# Patient Record
Sex: MALE | Race: BLACK OR AFRICAN AMERICAN | Employment: OTHER | URBAN - METROPOLITAN AREA
[De-identification: names, ages, dates, MRNs, and addresses within clinical notes are randomized per-mention and may not be internally consistent; named-entity substitution may affect disease eponyms.]

---

## 2019-10-30 ENCOUNTER — TELEPHONE (OUTPATIENT)
Dept: UROLOGY | Facility: AMBULATORY SURGERY CENTER | Age: 59
End: 2019-10-30

## 2019-10-30 NOTE — TELEPHONE ENCOUNTER
Patient has never had previous urologist and all his records and psa labs are in [place]    Being referred to Urology for routine exam

## 2019-11-04 ENCOUNTER — TELEPHONE (OUTPATIENT)
Dept: UROLOGY | Facility: MEDICAL CENTER | Age: 59
End: 2019-11-04

## 2019-11-04 NOTE — TELEPHONE ENCOUNTER
[employer] [facility] [employer]  Member ID# [id number]  Group# [id number]  Subscriber: David Pickering (wife)  Address: [address] [place] [address], [address] [place] [address] [place]

## 2019-11-05 ENCOUNTER — OFFICE VISIT (OUTPATIENT)
Dept: UROLOGY | Facility: CLINIC | Age: 59
End: 2019-11-05
Payer: COMMERCIAL

## 2019-11-05 VITALS
BODY MASS INDEX: 31.81 KG/M2 | DIASTOLIC BLOOD PRESSURE: 86 MMHG | SYSTOLIC BLOOD PRESSURE: 124 MMHG | HEART RATE: 65 BPM | HEIGHT: 73 IN | WEIGHT: 240 LBS

## 2019-11-05 DIAGNOSIS — R31.29 MICROSCOPIC HEMATURIA: Primary | ICD-10-CM

## 2019-11-05 PROCEDURE — 99204 OFFICE O/P NEW MOD 45 MIN: CPT | Performed by: UROLOGY

## 2019-11-05 RX ORDER — ATORVASTATIN CALCIUM 80 MG/1
80 TABLET, FILM COATED ORAL DAILY
COMMUNITY
Start: 2019-08-23 | End: 2020-08-22

## 2019-11-05 RX ORDER — ASPIRIN 81 MG/1
TABLET, COATED ORAL
COMMUNITY
Start: 2019-09-17

## 2019-11-05 RX ORDER — INSULIN GLARGINE 100 [IU]/ML
INJECTION, SOLUTION SUBCUTANEOUS
COMMUNITY
Start: 2019-11-01

## 2019-11-05 RX ORDER — LOSARTAN POTASSIUM 100 MG/1
100 TABLET ORAL DAILY
COMMUNITY
Start: 2019-08-23

## 2019-11-05 RX ORDER — DOCUSATE SODIUM 100 MG
CAPSULE ORAL
COMMUNITY
Start: 2019-09-04

## 2019-11-05 RX ORDER — FLASH GLUCOSE SENSOR
KIT MISCELLANEOUS
COMMUNITY
Start: 2019-10-13

## 2019-11-05 RX ORDER — INSULIN LISPRO 100 [IU]/ML
INJECTION, SOLUTION INTRAVENOUS; SUBCUTANEOUS
COMMUNITY
Start: 2019-09-24

## 2019-11-05 RX ORDER — FLASH GLUCOSE SCANNING READER
EACH MISCELLANEOUS
COMMUNITY
Start: 2019-10-13

## 2019-11-05 RX ORDER — AMLODIPINE BESYLATE 5 MG/1
10 TABLET ORAL
COMMUNITY
Start: 2019-08-29

## 2019-11-05 RX ORDER — MELATONIN
1000 2 TIMES DAILY
Refills: 0 | COMMUNITY
Start: 2019-08-05

## 2019-11-05 RX ORDER — TRIAMCINOLONE ACETONIDE 1 MG/G
CREAM TOPICAL
COMMUNITY
Start: 2019-09-05

## 2019-11-05 RX ORDER — LABETALOL 200 MG/1
200 TABLET, FILM COATED ORAL 2 TIMES DAILY
COMMUNITY
Start: 2019-08-23 | End: 2020-08-22

## 2019-11-05 RX ORDER — POLYETHYLENE GLYCOL-3350, SODIUM CHLORIDE, POTASSIUM CHLORIDE AND SODIUM BICARBONATE 420; 11.2; 5.72; 1.48 G/438.4G; G/438.4G; G/438.4G; G/438.4G
POWDER, FOR SOLUTION ORAL
Refills: 0 | COMMUNITY
Start: 2019-08-30

## 2019-11-05 RX ORDER — BLOOD-GLUCOSE METER
EACH MISCELLANEOUS
Refills: 0 | COMMUNITY
Start: 2019-08-05

## 2019-11-05 RX ORDER — PEN NEEDLE, DIABETIC 31 GX5/16"
NEEDLE, DISPOSABLE MISCELLANEOUS
COMMUNITY
Start: 2019-10-19

## 2019-11-05 RX ORDER — CLOPIDOGREL BISULFATE 75 MG/1
75 TABLET ORAL DAILY
Refills: 0 | COMMUNITY
Start: 2019-08-05

## 2019-11-05 NOTE — PROGRESS NOTES
Referring Physician: No primary care provider on file  A copy of this note was sent to the referring physician  Diagnoses and all orders for this visit:    Microscopic hematuria  -     Cystoscopy; Future  -     Creatinine, serum; Future  -     CT abdomen pelvis w wo contrast; Future    Other orders  -     amLODIPine (NORVASC) 5 mg tablet; 10 mg   -     ASPIRIN LOW DOSE 81 MG EC tablet  -     atorvastatin (LIPITOR) 80 mg tablet; Take 80 mg by mouth daily  -     Blood Glucose Monitoring Suppl (ONE TOUCH ULTRA 2) w/Device KIT; TEST 3 TIMES A DAY  -     cholecalciferol (VITAMIN D3) 1,000 units tablet; Take 1,000 Units by mouth 2 (two) times a day  -     clopidogrel (PLAVIX) 75 mg tablet; Take 75 mg by mouth daily  -     Continuous Blood Gluc  (FREESTYLE RENITA 14 DAY READER) MAU  -     Continuous Blood Gluc Sensor (FREESTYLE RENITA 14 DAY SENSOR) MISC  -     STOOL SOFTENER 100 MG capsule  -     ONE TOUCH ULTRA TEST test strip  -     insulin glargine (LANTUS SOLOSTAR) 100 units/mL injection pen; Inject 60 Units under the skin daily  -     LANTUS SOLOSTAR 100 units/mL injection pen  -     insulin lispro (HUMALOG KWIKPEN) 100 units/mL injection pen; 5 units for every 50mg/dL above 150mg/dL prn  Max dose of 50 units/day  -     B-D ULTRAFINE III SHORT PEN 31G X 8 MM MISC  -     labetalol (NORMODYNE) 200 mg tablet; Take 200 mg by mouth 2 (two) times a day  -     ONETOUCH DELICA LANCETS FINE MISC; TEST 3 TIMES A DAY  -     losartan (COZAAR) 100 MG tablet; Take 100 mg by mouth daily  -     GAVILYTE-N WITH FLAVOR PACK 420 g solution; Take as directed  -     triamcinolone (KENALOG) 0 1 % cream            Assessment and plan:       1  Microscopic hematuria      2  Weak urinary stream      Today, we discussed the multifactorial etiology of hematuria    I reviewed images to familiarize the patient with basic anatomy of the urinary tract, and we discussed how hematuria can be a herald sign of multiple genitourinary pathologies  As such, I recommended preceding with a full hematuria workup, including a Urogram protocol CT, a cystoscopy, as well as a urinalysis, urine culture and urine cytology  The patient understands the logic of this approach  The patient will be scheduled for these in the near future  All questions were answered today and there were no perceived barriers to education  Sarath Gallegos MD      Chief Complaint      microscopic hematuria      History of Present Illness     Yesica Santiago is a 64 y o  Male referred in consultation for microscopic hematuria  He was recently found to have 3-5 red blood cells per high-powered field on screening urinalysis  He has developed some weakening of his lower urinary tract stream over the past few months  He does have a history of prior stroke in feels his urination habits have changed since that time  He denies any frequency urgency or daytime or nocturnal incontinence  He denies any history of gross hematuria  Familial history is negative for genitourinary malignancies  He is a lifelong nonsmoker  He does take Plavix    Detailed Urologic History     - please refer to HPI    Review of Systems     Review of Systems   Constitutional: Negative for activity change and fatigue  HENT: Negative for congestion  Eyes: Negative for visual disturbance  Respiratory: Negative for shortness of breath and wheezing  Cardiovascular: Negative for chest pain and leg swelling  Gastrointestinal: Negative for abdominal pain  Endocrine: Negative for polyuria  Genitourinary: Positive for difficulty urinating  Negative for dysuria, flank pain, hematuria and urgency  Musculoskeletal: Negative for back pain  Allergic/Immunologic: Negative for immunocompromised state  Neurological: Negative for dizziness and numbness  Psychiatric/Behavioral: Negative for dysphoric mood  All other systems reviewed and are negative        AUA SYMPTOM SCORE Most Recent Value   AUA SYMPTOM SCORE   How often have you had a sensation of not emptying your bladder completely after you finished urinating? 0   How often have you had to urinate again less than two hours after you finished urinating? 0   How often have you found you stopped and started again several times when you urinate? 4   How often have you found it difficult to postpone urination? 0   How often have you had a weak urinary stream?  5   How often have you had to push or strain to begin urination? 0   How many times did you most typically get up to urinate from the time you went to bed at night until the time you got up in the morning? 4   Quality of Life: If you were to spend the rest of your life with your urinary condition just the way it is now, how would you feel about that?  5   AUA SYMPTOM SCORE  13            Allergies     No Known Allergies    Physical Exam     Physical Exam   Constitutional: He is oriented to person, place, and time  He appears well-developed and well-nourished  No distress  HENT:   Head: Normocephalic and atraumatic  Eyes: EOM are normal    Neck: Normal range of motion  Cardiovascular:    Negative lower extremity edema   Pulmonary/Chest: Effort normal and breath sounds normal    Abdominal: Soft  Genitourinary:   Genitourinary Comments:  20 g prostate no nodules or induration   Musculoskeletal: Normal range of motion  Neurological: He is alert and oriented to person, place, and time  Skin: Skin is warm  Psychiatric: He has a normal mood and affect   His behavior is normal            Vital Signs  Vitals:    11/05/19 0940   BP: 124/86   BP Location: Left arm   Patient Position: Sitting   Cuff Size: Extra-Large   Pulse: 65   Weight: 109 kg (240 lb)   Height: 6' 1" (1 854 m)         Current Medications       Current Outpatient Medications:     amLODIPine (NORVASC) 5 mg tablet, 10 mg , Disp: , Rfl:     ASPIRIN LOW DOSE 81 MG EC tablet, , Disp: , Rfl:    atorvastatin (LIPITOR) 80 mg tablet, Take 80 mg by mouth daily, Disp: , Rfl:     B-D ULTRAFINE III SHORT PEN 31G X 8 MM MISC, , Disp: , Rfl:     Blood Glucose Monitoring Suppl (ONE TOUCH ULTRA 2) w/Device KIT, TEST 3 TIMES A DAY, Disp: , Rfl: 0    Continuous Blood Gluc  (FREESTYLE RENITA 14 DAY READER) MAU, , Disp: , Rfl:     Continuous Blood Gluc Sensor (FREESTYLE RENITA 14 DAY SENSOR) MISC, , Disp: , Rfl:     insulin glargine (LANTUS SOLOSTAR) 100 units/mL injection pen, Inject 60 Units under the skin daily, Disp: , Rfl:     insulin lispro (HUMALOG KWIKPEN) 100 units/mL injection pen, 5 units for every 50mg/dL above 150mg/dL prn  Max dose of 50 units/day, Disp: , Rfl:     labetalol (NORMODYNE) 200 mg tablet, Take 200 mg by mouth 2 (two) times a day, Disp: , Rfl:     LANTUS SOLOSTAR 100 units/mL injection pen, , Disp: , Rfl:     losartan (COZAAR) 100 MG tablet, Take 100 mg by mouth daily, Disp: , Rfl:     ONE TOUCH ULTRA TEST test strip, , Disp: , Rfl:     ONETOUCH DELICA LANCETS FINE MISC, TEST 3 TIMES A DAY, Disp: , Rfl: 0    STOOL SOFTENER 100 MG capsule, , Disp: , Rfl:     cholecalciferol (VITAMIN D3) 1,000 units tablet, Take 1,000 Units by mouth 2 (two) times a day, Disp: , Rfl: 0    clopidogrel (PLAVIX) 75 mg tablet, Take 75 mg by mouth daily, Disp: , Rfl: 0    GAVILYTE-N WITH FLAVOR PACK 420 g solution, Take as directed, Disp: , Rfl: 0    triamcinolone (KENALOG) 0 1 % cream, , Disp: , Rfl:       Active Problems     There is no problem list on file for this patient  Past Medical History     Past Medical History:   Diagnosis Date    Diabetes mellitus [facility] )     Hypertension     Sleep apnea     Stroke ([facility] ) 07/18/2019         Surgical History     Past Surgical History:   Procedure Laterality Date    COLONOSCOPY           Family History     History reviewed  No pertinent family history        Social History     Social History     Social History     Tobacco Use   Smoking Status Never Smoker   Smokeless Tobacco Never Used         Pertinent Lab Values     No results found for: CREATININE    No results found for: PSA    @RESULTRCNT(1H])@      Pertinent Imaging      - n/a    Portions of the record may have been created with voice recognition software   Occasional wrong word or "sound a like" substitutions may have occurred due to the inherent limitations of voice recognition software   Read the chart carefully and recognize, using context, where substitutions have occurred

## 2019-11-21 ENCOUNTER — HOSPITAL ENCOUNTER (OUTPATIENT)
Dept: CT IMAGING | Facility: CLINIC | Age: 59
Discharge: HOME | End: 2019-11-21
Payer: COMMERCIAL

## 2019-11-21 DIAGNOSIS — R31.29 MICROSCOPIC HEMATURIA: ICD-10-CM

## 2019-11-21 PROCEDURE — 74178 CT ABD&PLV WO CNTR FLWD CNTR: CPT

## 2019-11-21 RX ADMIN — IOHEXOL 100 ML: 350 INJECTION, SOLUTION INTRAVENOUS at 11:32

## 2019-12-26 ENCOUNTER — PROCEDURE VISIT (OUTPATIENT)
Dept: UROLOGY | Facility: CLINIC | Age: 59
End: 2019-12-26
Payer: COMMERCIAL

## 2019-12-26 VITALS — HEIGHT: 73 IN | BODY MASS INDEX: 32.6 KG/M2 | WEIGHT: 246 LBS | HEART RATE: 71 BPM

## 2019-12-26 DIAGNOSIS — K63.9 THICKENED SMALL BOWEL: ICD-10-CM

## 2019-12-26 DIAGNOSIS — R31.29 MICROSCOPIC HEMATURIA: Primary | ICD-10-CM

## 2019-12-26 LAB
SL AMB  POCT GLUCOSE, UA: NORMAL
SL AMB LEUKOCYTE ESTERASE,UA: NORMAL
SL AMB POCT BILIRUBIN,UA: NORMAL
SL AMB POCT BLOOD,UA: NORMAL
SL AMB POCT CLARITY,UA: CLEAR
SL AMB POCT COLOR,UA: YELLOW
SL AMB POCT KETONES,UA: NORMAL
SL AMB POCT NITRITE,UA: NORMAL
SL AMB POCT PH,UA: 5
SL AMB POCT SPECIFIC GRAVITY,UA: 1.02
SL AMB POCT URINE PROTEIN: NORMAL
SL AMB POCT UROBILINOGEN: NORMAL

## 2019-12-26 PROCEDURE — 81002 URINALYSIS NONAUTO W/O SCOPE: CPT | Performed by: UROLOGY

## 2019-12-26 PROCEDURE — 52000 CYSTOURETHROSCOPY: CPT | Performed by: UROLOGY

## 2019-12-26 NOTE — PROGRESS NOTES
Office Cystoscopy Procedure Note    Indication:    Hematuria     upper tract imaging results:  FINDINGS:     ABDOMEN     RIGHT KIDNEY AND URETER:  No solid renal mass  No detectable urothelial mass though the distal ureter is not opacified on excretory images  No hydronephrosis or hydroureter  No urinary tract calculi  No perinephric collection      LEFT KIDNEY AND URETER:  No solid renal mass  No detectable urothelial mass though the proximal ureter is not opacified on excretory images  No hydronephrosis or hydroureter  No urinary tract calculi  No perinephric collection      URINARY BLADDER:  No bladder wall mass  No calculi         LOWER CHEST:  No clinically significant abnormality identified in the visualized lower chest      LIVER/BILIARY TREE:  Unremarkable  There is variant anatomy of the portal vein with the left portal vein arising from the anterior right portal vein  There is mild dilatation of the portal vein at the confluence of the left and anterior right veins      GALLBLADDER:  No calcified gallstones  No pericholecystic inflammatory change      SPLEEN:  Unremarkable      PANCREAS:  Unremarkable      ADRENAL GLANDS:  Unremarkable      STOMACH AND BOWEL:  Unremarkable      ABDOMINOPELVIC CAVITY:  No ascites  No free intraperitoneal air  There is mild stranding in the small bowel mesentery with subcentimeter lymph nodes  There is no pathologic lymphadenopathy      VESSELS:  Unremarkable for patient's age      PELVIS     REPRODUCTIVE ORGANS:  Unremarkable for patient's age      APPENDIX: No findings to suggest appendicitis      ABDOMINAL WALL/INGUINAL REGIONS:  Unremarkable      OSSEOUS STRUCTURES:  No acute fracture or destructive osseous lesion      IMPRESSION:     1  No CT abnormality identified to account for hematuria   Note that portions of the ureters were not opacified on excretory images      2   Small right renal cyst      3   Mild stranding in the small bowel mesentery with subcentimeter lymph nodes, 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  No pathologically enlarged lymph nodes are identified though there are no prior studies available for   comparison and a follow-up standard protocol CT of the abdomen is recommended in 6 months as this appearance can also be seen with early lymphoproliferative disease      The study was marked in EPIC for significant notification  Informed consent   The risks, benefits, complications, treatment options, and expected outcomes were discussed with the patient  The patient concurred with the proposed plan and provided informed consent  Anesthesia  Lidocaine jelly 2%    Antibiotic prophylaxis   None    Procedure  The patient was placed in the supineposition, was prepped and draped in the usual manner using sterile technique, and 2% lidocaine jelly instilled into the urethra  A 17 F flexible cystoscope was then inserted into the urethra and the urethra and bladder carefully examined  The following findings were noted:    Findings:  Urethra:  Normal  Prostate:  Normal  Bladder:  Normal  Ureteral orifices:  Normal  Other findings:  None     Specimens: None                 Complications:    None; patient tolerated the procedure well           Disposition: To home after 30 minute observation  Condition: Stable    Plan:     Patient has now completed a full hematuria evaluation  Thankfully there are no signs of genitourinary   Pathology or certainly any concern for malignancy  I recommended annual urinalysis as a continue portion of his well visits with his primary care physician  If he were to have persistent microscopic hematuria x3 years I would like to see him back for repeat evaluation  I did discuss the small bowel thickening and subclinical upper abdominal lymphadenopathy on his CT scan  I discussed this with this patient and his wife    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

## 2020-01-23 ENCOUNTER — OFFICE VISIT (OUTPATIENT)
Dept: GASTROENTEROLOGY | Facility: CLINIC | Age: 60
End: 2020-01-23
Payer: COMMERCIAL

## 2020-01-23 VITALS
SYSTOLIC BLOOD PRESSURE: 130 MMHG | WEIGHT: 246 LBS | DIASTOLIC BLOOD PRESSURE: 82 MMHG | HEIGHT: 73 IN | BODY MASS INDEX: 32.6 KG/M2 | HEART RATE: 59 BPM

## 2020-01-23 DIAGNOSIS — K63.9 THICKENED SMALL BOWEL: ICD-10-CM

## 2020-01-23 PROCEDURE — 99214 OFFICE O/P EST MOD 30 MIN: CPT | Performed by: INTERNAL MEDICINE

## 2020-01-23 NOTE — PROGRESS NOTES
Consultation - [facility]  Lissette Ankush 1960 64 y o  male     ASSESSMENT @ PLAN:   He is a 63-year-old male who had a CT scan with mild stranding in the small bowel with no overt GI symptoms  I suspect this is of no consequence  He has a remote history of peptic ulcer disease in 2001 with H pylori treated  He had a normal colonoscopy with 6 polyps removed in December of 2019     1 will order CT scan in 6 months is recommended    2 will do stool H pylori antigen    Chief Complaint:     HPI:   He is a 63-year-old man who had a CT scan with Urology that showed possible mesenteric panniculitis or haziness in the small bowel mesentery  The patient has no abdominal pain  He has minor constipation that is improved on Colace  He has no melena hematochezia  He had a colonoscopy in December 26 that showed 6 polyps removed otherwise normal   He has remote history of peptic ulcer disease and had a bleeding ulcer treated in [place] in 2001 that was from H pylori  He was treated with Biaxin Prevpac and was completely treated  He never had a test of cure  He is doing well  He has no heartburn regurgitation nausea vomiting the patient has no night sweats or weight loss  REVIEW OF SYSTEMS:     CONSTITUTIONAL: Denies any fever, chills, or rigors  Good appetite, and no recent weight loss  HEENT: No earache or tinnitus  Denies hearing loss or visual disturbances  CARDIOVASCULAR: No chest pain or palpitations  RESPIRATORY: Denies any cough, hemoptysis, shortness of breath or dyspnea on exertion  GASTROINTESTINAL: As noted in the History of Present Illness  GENITOURINARY: No problems with urination  Denies any hematuria or dysuria  NEUROLOGIC: No dizziness or vertigo, denies headaches  MUSCULOSKELETAL: Denies any muscle or joint pain  SKIN: Denies skin rashes or itching  ENDOCRINE: Denies excessive thirst  Denies intolerance to heat or cold    PSYCHOSOCIAL: Denies depression or anxiety  Denies any recent memory loss  Past Medical History:   Diagnosis Date    [facility] mellitus ([facility] )     Hypertension     Sleep apnea     Stroke ([facility] ) 07/18/2019      Past Surgical History:   Procedure Laterality Date    COLONOSCOPY       Social History     Socioeconomic History    Marital status: /Civil Union     Spouse name: Not on file    Number of children: Not on file    Years of education: Not on file    Highest education level: Not on file   Occupational History    Not on file   Social Needs    Financial resource strain: Not on file    Food insecurity:     Worry: Not on file     Inability: Not on file    Transportation needs:     Medical: Not on file     Non-medical: Not on file   Tobacco Use    Smoking status: Never Smoker    Smokeless tobacco: Never Used   Substance and Sexual Activity    Alcohol use: Never     Frequency: Never    Drug use: Never    Sexual activity: Not on file   Lifestyle    Physical activity:     Days per week: Not on file     Minutes per session: Not on file    Stress: Not on file   Relationships    Social connections:     Talks on phone: Not on file     Gets together: Not on file     Attends Zoroastrianism service: Not on file     Active member of club or organization: Not on file     Attends meetings of clubs or organizations: Not on file     Relationship status: Not on file    Intimate partner violence:     Fear of current or ex partner: Not on file     Emotionally abused: Not on file     Physically abused: Not on file     Forced sexual activity: Not on file   Other Topics Concern    Not on file   Social History Narrative    Not on file     History reviewed  No pertinent family history  Patient has no known allergies    Current Outpatient Medications   Medication Sig Dispense Refill    amLODIPine (NORVASC) 5 mg tablet 10 mg       ASPIRIN LOW DOSE 81 MG EC tablet       atorvastatin (LIPITOR) 80 mg tablet Take 80 mg by mouth daily      B-D ULTRAFINE III SHORT PEN 31G X 8 MM MISC       Blood Glucose Monitoring Suppl (ONE TOUCH ULTRA 2) w/Device KIT TEST 3 TIMES A DAY  0    cholecalciferol (VITAMIN D3) 1,000 units tablet Take 1,000 Units by mouth 2 (two) times a day  0    Continuous Blood Gluc Sensor (FREESTYLE RENITA 14 DAY SENSOR) MISC       insulin lispro (HUMALOG KWIKPEN) 100 units/mL injection pen 5 units for every 50mg/dL above 150mg/dL prn  Max dose of 50 units/day      labetalol (NORMODYNE) 200 mg tablet Take 200 mg by mouth 2 (two) times a day      LANTUS SOLOSTAR 100 units/mL injection pen       losartan (COZAAR) 100 MG tablet Take 100 mg by mouth daily      ONE TOUCH ULTRA TEST test strip       ONETOUCH DELICA LANCETS FINE MISC TEST 3 TIMES A DAY  0    STOOL SOFTENER 100 MG capsule       triamcinolone (KENALOG) 0 1 % cream       clopidogrel (PLAVIX) 75 mg tablet Take 75 mg by mouth daily  0    Continuous Blood Gluc  (FREESTYLE RENITA 14 DAY READER) MAU       GAVILYTE-N WITH FLAVOR PACK 420 g solution Take as directed  0    insulin glargine (LANTUS SOLOSTAR) 100 units/mL injection pen Inject 60 Units under the skin daily       No current facility-administered medications for this visit  Blood pressure 130/82, pulse 59, height 6' 1" (1 854 m), weight 112 kg (246 lb)  PHYSICAL EXAM:     General Appearance:   Alert, cooperative, no distress, appears stated age    HEENT:   Normocephalic, atraumatic, anicteric      Neck:  Supple, symmetrical, trachea midline, no adenopathy;    thyroid: no enlargement/tenderness/nodules; no carotid  bruit or JVD    Lungs:   Clear to auscultation bilaterally; no rales, rhonchi or wheezing; respirations unlabored    Heart[de-identified]   S1 and S2 normal; regular rate and rhythm; no murmur, rub, or gallop     Abdomen:   Soft, non-tender, non-distended; normal bowel sounds; no masses, no organomegaly    Genitalia:   Deferred    Rectal:   Deferred    Extremities:  No cyanosis, clubbing or edema  Pulses:  2+ and symmetric all extremities    Skin:  Skin color, texture, turgor normal, no rashes or lesions    Lymph nodes:  No palpable cervical, axillary or inguinal lymphadenopathy        No results found for: WBC, HGB, HCT, MCV, PLT  No results found for: GLUCOSE, CALCIUM, NA, K, CO2, CL, BUN, CREATININE  No results found for: ALT, AST, GGT, ALKPHOS, BILITOT  No results found for: INR, PROTIME

## 2020-01-28 ENCOUNTER — APPOINTMENT (OUTPATIENT)
Dept: LAB | Facility: HOSPITAL | Age: 60
End: 2020-01-28
Attending: INTERNAL MEDICINE
Payer: COMMERCIAL

## 2020-01-28 DIAGNOSIS — K63.9 THICKENED SMALL BOWEL: ICD-10-CM

## 2020-01-28 PROCEDURE — 87338 HPYLORI STOOL AG IA: CPT

## 2020-01-29 LAB — H PYLORI AG STL QL IA: NEGATIVE

## 2020-05-29 ENCOUNTER — TRANSCRIBE ORDERS (OUTPATIENT)
Dept: ADMINISTRATIVE | Facility: HOSPITAL | Age: 60
End: 2020-05-29

## 2020-06-01 ENCOUNTER — HOSPITAL ENCOUNTER (OUTPATIENT)
Dept: CT IMAGING | Facility: CLINIC | Age: 60
Discharge: HOME | End: 2020-06-01
Payer: COMMERCIAL

## 2020-06-01 DIAGNOSIS — K63.9 THICKENED SMALL BOWEL: ICD-10-CM

## 2020-06-01 PROCEDURE — 74177 CT ABD & PELVIS W/CONTRAST: CPT

## 2020-06-01 RX ADMIN — IOHEXOL 100 ML: 350 INJECTION, SOLUTION INTRAVENOUS at 10:48

## 2020-06-02 ENCOUNTER — TELEPHONE (OUTPATIENT)
Dept: GASTROENTEROLOGY | Facility: CLINIC | Age: 60
End: 2020-06-02

## 2022-01-11 ENCOUNTER — TELEPHONE (OUTPATIENT)
Dept: GASTROENTEROLOGY | Facility: CLINIC | Age: 62
End: 2022-01-11

## 2022-01-11 NOTE — TELEPHONE ENCOUNTER
Recall call went out via [employer] in 2020 with no return calls from pt to schedule  Pt is due for a colon with Dr Jose De Jesus Santana for hx of tubular adenoma polyp/hx of large polyp  Recall letter mailed to pt

## 2022-12-05 RX ORDER — CHLORTHALIDONE 25 MG/1
1 TABLET ORAL DAILY
COMMUNITY
Start: 2022-09-28

## 2022-12-05 RX ORDER — DOCUSATE SODIUM 100 MG/1
100 CAPSULE, LIQUID FILLED ORAL EVERY 12 HOURS
COMMUNITY
Start: 2019-09-04

## 2022-12-05 RX ORDER — TRIAMCINOLONE ACETONIDE 1 MG/G
CREAM TOPICAL
COMMUNITY

## 2022-12-08 ENCOUNTER — OFFICE VISIT (OUTPATIENT)
Dept: GASTROENTEROLOGY | Facility: CLINIC | Age: 62
End: 2022-12-08

## 2022-12-08 VITALS
HEIGHT: 72 IN | SYSTOLIC BLOOD PRESSURE: 120 MMHG | WEIGHT: 257.2 LBS | BODY MASS INDEX: 34.84 KG/M2 | OXYGEN SATURATION: 98 % | DIASTOLIC BLOOD PRESSURE: 88 MMHG | HEART RATE: 70 BPM

## 2022-12-08 DIAGNOSIS — Z12.11 SCREENING FOR MALIGNANT NEOPLASM OF COLON: ICD-10-CM

## 2022-12-08 DIAGNOSIS — D12.6 ADENOMATOUS POLYP OF COLON, UNSPECIFIED PART OF COLON: Primary | ICD-10-CM

## 2022-12-08 DIAGNOSIS — R10.9 ABDOMINAL CRAMPING: ICD-10-CM

## 2022-12-08 RX ORDER — MONTELUKAST SODIUM 10 MG/1
TABLET ORAL
COMMUNITY
Start: 2022-10-11

## 2022-12-08 RX ORDER — DICYCLOMINE HCL 20 MG
20 TABLET ORAL EVERY 6 HOURS PRN
Qty: 75 TABLET | Refills: 2 | Status: SHIPPED | OUTPATIENT
Start: 2022-12-08

## 2022-12-08 RX ORDER — AMLODIPINE BESYLATE 10 MG/1
TABLET ORAL
COMMUNITY
Start: 2022-11-09

## 2022-12-08 RX ORDER — UBIQUINOL 100 MG
CAPSULE ORAL
COMMUNITY
Start: 2022-09-27

## 2022-12-08 NOTE — PATIENT INSTRUCTIONS
Scheduled date of colonoscopy (as of today):2/20/23  Physician performing colonoscopy: Kulwinder  Location of colonoscopy:[place]  Bowel prep reviewed with patient:Nidia/Miralax  Instructions reviewed with patient by:Shiva lam  Clearances:  none

## 2022-12-08 NOTE — PROGRESS NOTES
[facility]  Katina Anaya 63 y o  male MRN: [id number]       CC: Colonoscopy recall    HPI: Karina Wakefield is a 44-year-old male with history of type 2 diabetes, obstructive sleep apnea, hypertension and stroke in 2019  Patient presents to the office to schedule his recall colonoscopy  His last colonoscopy was in 2019 at [facility]  His biopsy results from care everywhere revealed that he had several tubular adenomas removed  Patient also notes that he experiences episodic episodes of alternating diarrhea and constipation with a lower abdominal cramping during times of increased anxiety  He denies signs or GI bleeding or unintentional weight loss  Review of Systems:    CONSTITUTIONAL: Denies any fever, chills, or rigors  Good appetite, and no recent weight loss  HEENT: No earache or tinnitus  Denies hearing loss or visual disturbances  CARDIOVASCULAR: No chest pain or palpitations  RESPIRATORY: Denies any cough, hemoptysis, shortness of breath or dyspnea on exertion  GASTROINTESTINAL: As noted in the History of Present Illness  GENITOURINARY: No problems with urination  Denies any hematuria or dysuria  NEUROLOGIC: No dizziness or vertigo, denies headaches  MUSCULOSKELETAL: Denies any muscle or joint pain  SKIN: Denies skin rashes or itching  ENDOCRINE: Denies excessive thirst  Denies intolerance to heat or cold  PSYCHOSOCIAL: Denies depression or anxiety  Denies any recent memory loss         Current Outpatient Medications   Medication Sig Dispense Refill   • Alcohol Swabs (Alcohol Prep) 70 % PADS      • amLODIPine (NORVASC) 10 mg tablet      • amLODIPine (NORVASC) 5 mg tablet 10 mg      • ASPIRIN LOW DOSE 81 MG EC tablet      • B-D ULTRAFINE III SHORT PEN 31G X 8 MM MISC      • Blood Glucose Monitoring Suppl (ONE TOUCH ULTRA 2) w/Device KIT TEST 3 TIMES A DAY  0   • chlorthalidone 25 mg tablet Take 1 tablet by mouth daily     • cholecalciferol (VITAMIN D3) 1,000 units tablet Take 1,000 Units by mouth 2 (two) times a day  0   • Cholecalciferol (Vitamin D3) 25 MCG (1000 UT) CAPS Vitamin D3 25 mcg (1,000 unit) tablet     • clopidogrel (PLAVIX) 75 mg tablet Take 75 mg by mouth daily  0   • Continuous Blood Gluc  (FREESTYLE RENITA 14 DAY READER) MAU      • Continuous Blood Gluc Sensor (FREESTYLE RENITA 14 DAY SENSOR) MISC      • dicyclomine (BENTYL) 20 mg tablet Take 1 tablet (20 mg total) by mouth every 6 (six) hours as needed (abdominal cramping) 75 tablet 2   • docusate sodium (COLACE) 100 mg capsule 100 mg Every 12 hours     • Empagliflozin 25 MG TABS Take 25 mg by mouth daily     • GAVILYTE-N WITH FLAVOR PACK 420 g solution Take as directed  0   • insulin glargine (LANTUS SOLOSTAR) 100 units/mL injection pen Inject 60 Units under the skin daily     • insulin lispro (HumaLOG) 100 units/mL injection pen 5 units for every 50mg/dL above 150mg/dL prn  Max dose of 50 units/day     • LANTUS SOLOSTAR 100 units/mL injection pen      • losartan (COZAAR) 100 MG tablet Take 100 mg by mouth daily     • montelukast (SINGULAIR) 10 mg tablet      • ONE TOUCH ULTRA TEST test strip      • ONETOUCH DELICA LANCETS FINE MISC TEST 3 TIMES A DAY  0   • Semaglutide,0 25 or 0 5MG/DOS, 2 MG/1 5ML SOPN Inject 0 25 mg under the skin Once a week     • STOOL SOFTENER 100 MG capsule      • triamcinolone (KENALOG) 0 1 % cream      • triamcinolone (KENALOG) 0 1 % cream triamcinolone acetonide 0 1 % topical cream     • atorvastatin (LIPITOR) 80 mg tablet Take 80 mg by mouth daily     • labetalol (NORMODYNE) 200 mg tablet Take 200 mg by mouth 2 (two) times a day       No current facility-administered medications for this visit       Past Medical History:   Diagnosis Date   • Diabetes mellitus ([address] )    • Hypertension    • Sleep apnea    • Stroke [facility]) 07/18/2019     Past Surgical History:   Procedure Laterality Date   • COLONOSCOPY       Social History     Socioeconomic History   • Marital status: /Civil [employer]     Spouse name: None   • Number of children: None   • Years of education: None   • Highest education level: None   Occupational History   • None   Tobacco Use   • Smoking status: Never   • Smokeless tobacco: Never   Substance and Sexual Activity   • Alcohol use: Never   • Drug use: Never   • Sexual activity: None   Other Topics Concern   • None   Social History Narrative   • None     Social Determinants of Health     Financial Resource Strain: Not on file   Food Insecurity: Not on file   Transportation Needs: Not on file   Physical Activity: Not on file   Stress: Not on file   Social Connections: Not on file   Intimate Partner Violence: Not on file   Housing Stability: Not on file     History reviewed  No pertinent family history  PHYSICAL EXAM:    Vitals:    12/08/22 1119   BP: 120/88   BP Location: Left arm   Patient Position: Sitting   Cuff Size: Large   Pulse: 70   SpO2: 98%   Weight: 117 kg (257 lb 3 2 oz)   Height: 6' (1 829 m)     General Appearance:   Alert and oriented x 3  Cooperative, and in no respiratory distress   HEENT:   Normocephalic, atraumatic, anicteric      Neck:  Supple, symmetrical, trachea midline   Lungs:   Clear to auscultation bilaterally    Heart[de-identified]   Regular rate and rhythm   Abdomen:   Soft, non-tender, non-distended; normal bowel sounds; no masses, no organomegaly    Genitalia:   Deferred    Rectal:   Deferred    Extremities:  No cyanosis, clubbing or edema    Pulses:  2+ and symmetric all extremities    Skin:  Skin color, texture, turgor normal, no rashes or lesions    Lymph nodes:  No palpable cervical or supraclavicular lymphadenopathy        Lab Results:             Invalid input(s): LABALBU            Imaging Studies: I have personally reviewed pertinent imaging studies  CT abdomen pelvis w contrast    Result Date: 6/2/2020  Impression: Previously described findings of stranding in the small bowel mesentery have resolved in the interim   Apparent bowel wall thickening of the ascending and transverse colon without pericolonic inflammation  Correlate for symptomatology of colitis  The study was marked in EPIC for significant notification  Workstation performed: [id number]       ASSESSMENT and PLAN:      1) Need for colonoscopy screening secondary to personal history of precancerous colon polyps - His last colonoscopy was in 2019 with removal of tubular adenoma colon polyps [facility]  Biopsy results are available in Care Everywhere  - We will schedule colonoscopy recall    2) Episodic lower abdominal cramping and alternating area and constipation - Worse in times of stress  Patient reports he will have temporary episodes of diarrhea  He denies signs of her GI bleeding or unintentional weight loss  - Patient verified that he does not have a history of glaucoma, therefore I will trial him on Bentyl  - Colonoscopy to investigate as above        Follow up after colonoscopy if symptoms do not improve

## 2023-02-20 ENCOUNTER — HOSPITAL ENCOUNTER (OUTPATIENT)
Dept: GASTROENTEROLOGY | Facility: HOSPITAL | Age: 63
Setting detail: OUTPATIENT SURGERY
Discharge: HOME | End: 2023-02-20

## 2023-02-20 ENCOUNTER — ANESTHESIA EVENT (OUTPATIENT)
Dept: GASTROENTEROLOGY | Facility: HOSPITAL | Age: 63
End: 2023-02-20

## 2023-02-20 ENCOUNTER — ANESTHESIA (OUTPATIENT)
Dept: GASTROENTEROLOGY | Facility: HOSPITAL | Age: 63
End: 2023-02-20

## 2023-02-20 VITALS
WEIGHT: 251.8 LBS | HEART RATE: 64 BPM | DIASTOLIC BLOOD PRESSURE: 72 MMHG | TEMPERATURE: 97.4 F | RESPIRATION RATE: 18 BRPM | OXYGEN SATURATION: 98 % | HEIGHT: 72 IN | SYSTOLIC BLOOD PRESSURE: 128 MMHG | BODY MASS INDEX: 34.1 KG/M2

## 2023-02-20 DIAGNOSIS — D12.6 ADENOMATOUS POLYP OF COLON, UNSPECIFIED PART OF COLON: ICD-10-CM

## 2023-02-20 DIAGNOSIS — Z12.11 SCREENING FOR MALIGNANT NEOPLASM OF COLON: ICD-10-CM

## 2023-02-20 PROBLEM — I10 HTN (HYPERTENSION): Status: ACTIVE | Noted: 2023-02-20

## 2023-02-20 PROBLEM — Z79.4 INSULIN DEPENDENT TYPE 2 DIABETES MELLITUS (HCC): Status: ACTIVE | Noted: 2023-02-20

## 2023-02-20 PROBLEM — E66.9 OBESITY (BMI 30.0-34.9): Status: ACTIVE | Noted: 2023-02-20

## 2023-02-20 PROBLEM — E11.9 DIABETES MELLITUS, TYPE 2 (HCC): Status: ACTIVE | Noted: 2023-02-20

## 2023-02-20 PROBLEM — G47.30 SLEEP APNEA: Status: ACTIVE | Noted: 2023-02-20

## 2023-02-20 PROBLEM — I63.9 CVA (CEREBRAL VASCULAR ACCIDENT) (HCC): Status: ACTIVE | Noted: 2023-02-20

## 2023-02-20 PROBLEM — E66.811 OBESITY (BMI 30.0-34.9): Status: ACTIVE | Noted: 2023-02-20

## 2023-02-20 LAB — GLUCOSE SERPL-MCNC: 130 MG/DL (ref 65–140)

## 2023-02-20 RX ORDER — PROPOFOL 10 MG/ML
INJECTION, EMULSION INTRAVENOUS AS NEEDED
Status: DISCONTINUED | OUTPATIENT
Start: 2023-02-20 | End: 2023-02-20

## 2023-02-20 RX ORDER — SODIUM CHLORIDE, SODIUM LACTATE, POTASSIUM CHLORIDE, CALCIUM CHLORIDE 600; 310; 30; 20 MG/100ML; MG/100ML; MG/100ML; MG/100ML
125 INJECTION, SOLUTION INTRAVENOUS CONTINUOUS
Status: CANCELLED | OUTPATIENT
Start: 2023-02-20

## 2023-02-20 RX ORDER — LIDOCAINE HYDROCHLORIDE 20 MG/ML
INJECTION, SOLUTION EPIDURAL; INFILTRATION; INTRACAUDAL; PERINEURAL AS NEEDED
Status: DISCONTINUED | OUTPATIENT
Start: 2023-02-20 | End: 2023-02-20

## 2023-02-20 RX ORDER — SODIUM CHLORIDE, SODIUM LACTATE, POTASSIUM CHLORIDE, CALCIUM CHLORIDE 600; 310; 30; 20 MG/100ML; MG/100ML; MG/100ML; MG/100ML
INJECTION, SOLUTION INTRAVENOUS CONTINUOUS PRN
Status: DISCONTINUED | OUTPATIENT
Start: 2023-02-20 | End: 2023-02-20

## 2023-02-20 RX ADMIN — LIDOCAINE HYDROCHLORIDE 100 MG: 20 INJECTION, SOLUTION EPIDURAL; INFILTRATION; INTRACAUDAL; PERINEURAL at 11:42

## 2023-02-20 RX ADMIN — SODIUM CHLORIDE, SODIUM LACTATE, POTASSIUM CHLORIDE, AND CALCIUM CHLORIDE: .6; .31; .03; .02 INJECTION, SOLUTION INTRAVENOUS at 11:33

## 2023-02-20 RX ADMIN — PROPOFOL 100 MG: 10 INJECTION, EMULSION INTRAVENOUS at 11:43

## 2023-02-20 RX ADMIN — PROPOFOL 50 MG: 10 INJECTION, EMULSION INTRAVENOUS at 11:46

## 2023-02-20 RX ADMIN — PROPOFOL 50 MG: 10 INJECTION, EMULSION INTRAVENOUS at 11:48

## 2023-02-20 RX ADMIN — PROPOFOL 50 MG: 10 INJECTION, EMULSION INTRAVENOUS at 11:51

## 2023-02-20 NOTE — ANESTHESIA POSTPROCEDURE EVALUATION
Post-Op Assessment Note    CV Status:  Stable  Pain Score: 0    Pain management: adequate     Mental Status:  Alert and awake   Hydration Status:  Euvolemic   PONV Controlled:  Controlled   Airway Patency:  Patent      Post Op Vitals Reviewed: Yes      Staff: CRNA         No notable events documented      BP   100/57   Temp     Pulse  68   Resp   20   SpO2   98

## 2023-02-20 NOTE — H&P
History and Physical - [facility]  Jewell Leyva 63 y o  male MRN: [id number]                  HPI: Jewell Leyva is a 63y o  year old male who presents for colonoscopy for history of colon polyp      REVIEW OF SYSTEMS: Per the HPI, and otherwise unremarkable  Historical Information   Past Medical History:   Diagnosis Date   • Diabetes mellitus ([facility] )    • Hypertension    • Sleep apnea    • Stroke ([facility] ) 07/18/2019     Past Surgical History:   Procedure Laterality Date   • COLONOSCOPY       Social History   Social History     Substance and Sexual Activity   Alcohol Use Never     Social History     Substance and Sexual Activity   Drug Use Never     Social History     Tobacco Use   Smoking Status Never   Smokeless Tobacco Never     History reviewed  No pertinent family history  Meds/Allergies     (Not in a hospital admission)      No Known Allergies    Objective     Blood pressure 139/76, pulse 69, temperature (!) 97 3 °F (36 3 °C), temperature source Temporal, resp  rate 18, height 6' (1 829 m), weight 114 kg (251 lb 12 8 oz), SpO2 99 %  PHYSICAL EXAM    /76   Pulse 69   Temp (!) 97 3 °F (36 3 °C) (Temporal)   Resp 18   Ht 6' (1 829 m)   Wt 114 kg (251 lb 12 8 oz)   SpO2 99%   BMI 34 15 kg/m²       Gen: NAD  CV: RRR  CHEST: Clear  ABD: soft, NT/ND  EXT: no edema      ASSESSMENT/PLAN:  This is a 63y o  year old male here for colonoscopy, and he is stable and optimized for his procedure

## 2023-02-20 NOTE — ANESTHESIA PREPROCEDURE EVALUATION
Procedure:  COLONOSCOPY    Relevant Problems   ANESTHESIA (within normal limits)   (-) History of anesthesia complications      CARDIO   (+) HTN (hypertension) (took labetalol this AM)      ENDO   (+) Insulin dependent type 2 diabetes mellitus (HCC)      GI/HEPATIC  Confirmed NPO appropriate  s/p bowel prep      /RENAL (within normal limits)      HEMATOLOGY (within normal limits)      MUSCULOSKELETAL (within normal limits)      NEURO/PSYCH   (+) CVA (cerebral vascular accident) ([address] ) (2019, no residual deficits)      PULMONARY   (+) Sleep apnea (uses CPAP)   (-) Smoking   (-) URI (upper respiratory infection)      Other   (+) Obesity (BMI 30 0-34  9)        Physical Exam    Airway    Mallampati score: II  TM Distance: >3 FB  Neck ROM: full     Dental       Cardiovascular  Rhythm: regular, Rate: normal,     Pulmonary  Breath sounds clear to auscultation,     Other Findings        Anesthesia Plan  ASA Score- 3     Anesthesia Type- IV sedation with anesthesia with ASA Monitors  Additional Monitors:   Airway Plan:     Comment: I discussed the risks and benefits of IV sedation anesthesia including the possibility of the need to convert to general anesthesia and the potential risk of awareness  The patient was given the opportunity to ask questions, which were answered          Plan Factors-Exercise tolerance (METS): >4 METS  Chart reviewed  Patient is not a current smoker  Induction- intravenous  Postoperative Plan-     Informed Consent- Anesthetic plan and risks discussed with patient  I personally reviewed this patient with the CRNA  Discussed and agreed on the Anesthesia Plan with the CRNA  Jarek Garcia

## 2024-10-03 ENCOUNTER — APPOINTMENT (OUTPATIENT)
Dept: LAB | Facility: HOSPITAL | Age: 64
End: 2024-10-03

## 2024-10-03 DIAGNOSIS — Z00.6 ENCOUNTER FOR EXAMINATION FOR NORMAL COMPARISON OR CONTROL IN CLINICAL RESEARCH PROGRAM: ICD-10-CM

## 2024-10-03 PROCEDURE — 36415 COLL VENOUS BLD VENIPUNCTURE: CPT

## 2024-10-16 LAB
APOB+LDLR+PCSK9 GENE MUT ANL BLD/T: NOT DETECTED
BRCA1+BRCA2 DEL+DUP + FULL MUT ANL BLD/T: NOT DETECTED
MLH1+MSH2+MSH6+PMS2 GN DEL+DUP+FUL M: NOT DETECTED